# Patient Record
Sex: FEMALE | URBAN - METROPOLITAN AREA
[De-identification: names, ages, dates, MRNs, and addresses within clinical notes are randomized per-mention and may not be internally consistent; named-entity substitution may affect disease eponyms.]

---

## 2021-07-02 ENCOUNTER — OFFICE VISIT (OUTPATIENT)
Dept: URBAN - METROPOLITAN AREA CLINIC 18 | Facility: CLINIC | Age: 56
End: 2021-07-02
Payer: COMMERCIAL

## 2021-07-02 DIAGNOSIS — H53.19 OTHER SUBJECTIVE VISUAL DISTURBANCES: Primary | ICD-10-CM

## 2021-07-02 DIAGNOSIS — I10 HYPERTENSION: ICD-10-CM

## 2021-07-02 PROCEDURE — 99203 OFFICE O/P NEW LOW 30 MIN: CPT | Performed by: STUDENT IN AN ORGANIZED HEALTH CARE EDUCATION/TRAINING PROGRAM

## 2021-07-02 ASSESSMENT — INTRAOCULAR PRESSURE
OS: 15
OD: 19

## 2021-07-02 NOTE — IMPRESSION/PLAN
Impression: Hypertension: I10. Plan: Discussed findings, no hypertensive retinopathy noted today OU. Pt educated on importance of controlled blood sugar/pressure and annual dilated eye exams. Continue systemic management with PCP.

## 2021-07-02 NOTE — IMPRESSION/PLAN
Impression: Other subjective visual disturbances: H53.19. Plan: Discussed findings. Pt's symptoms sound more consistent with ocular migraine instead of classic flashes and floaters. Pt also reports that she starts to get a headache after seeing the lights in her vision. Retina intact OU, no breaks noted. Reviewed signs/symptoms of RD and pt educated to contact clinic immediately if experiencing.